# Patient Record
Sex: FEMALE
[De-identification: names, ages, dates, MRNs, and addresses within clinical notes are randomized per-mention and may not be internally consistent; named-entity substitution may affect disease eponyms.]

---

## 2019-08-08 PROBLEM — Z00.00 ENCOUNTER FOR PREVENTIVE HEALTH EXAMINATION: Status: ACTIVE | Noted: 2019-08-08

## 2019-08-26 ENCOUNTER — APPOINTMENT (OUTPATIENT)
Dept: PHYSICAL MEDICINE AND REHAB | Facility: CLINIC | Age: 44
End: 2019-08-26
Payer: COMMERCIAL

## 2019-08-26 VITALS — HEIGHT: 66 IN | RESPIRATION RATE: 16 BRPM | BODY MASS INDEX: 23.78 KG/M2 | WEIGHT: 148 LBS

## 2019-08-26 VITALS — OXYGEN SATURATION: 99 % | HEART RATE: 66 BPM

## 2019-08-26 DIAGNOSIS — Z72.0 TOBACCO USE: ICD-10-CM

## 2019-08-26 PROCEDURE — 99204 OFFICE O/P NEW MOD 45 MIN: CPT

## 2019-09-10 ENCOUNTER — APPOINTMENT (OUTPATIENT)
Dept: PHYSICAL MEDICINE AND REHAB | Facility: CLINIC | Age: 44
End: 2019-09-10
Payer: COMMERCIAL

## 2019-09-24 ENCOUNTER — APPOINTMENT (OUTPATIENT)
Dept: PHYSICAL MEDICINE AND REHAB | Facility: CLINIC | Age: 44
End: 2019-09-24
Payer: COMMERCIAL

## 2019-09-24 DIAGNOSIS — M51.17 INTERVERTEBRAL DISC DISORDERS WITH RADICULOPATHY, LUMBOSACRAL REGION: ICD-10-CM

## 2019-09-24 PROCEDURE — 64483 NJX AA&/STRD TFRM EPI L/S 1: CPT | Mod: 50

## 2023-04-12 ENCOUNTER — APPOINTMENT (OUTPATIENT)
Dept: PHYSICAL MEDICINE AND REHAB | Facility: CLINIC | Age: 48
End: 2023-04-12
Payer: COMMERCIAL

## 2023-04-12 VITALS
SYSTOLIC BLOOD PRESSURE: 133 MMHG | BODY MASS INDEX: 25.88 KG/M2 | HEART RATE: 81 BPM | WEIGHT: 161 LBS | HEIGHT: 66 IN | DIASTOLIC BLOOD PRESSURE: 83 MMHG

## 2023-04-12 PROCEDURE — 99205 OFFICE O/P NEW HI 60 MIN: CPT

## 2023-04-12 RX ORDER — METHOCARBAMOL 750 MG/1
750 TABLET, FILM COATED ORAL
Qty: 60 | Refills: 0 | Status: ACTIVE | COMMUNITY
Start: 2023-04-12 | End: 1900-01-01

## 2023-04-12 RX ORDER — GABAPENTIN 100 MG/1
100 CAPSULE ORAL
Qty: 60 | Refills: 3 | Status: ACTIVE | COMMUNITY
Start: 2023-04-12 | End: 1900-01-01

## 2023-04-12 NOTE — PHYSICAL EXAM
[FreeTextEntry1] : Gen: A+O x 3 in NAD\par Psych: Normal mood and affect. Responds appropriately to commands\par Eyes: Anicteric. No discharge. EOMI.\par Resp: Breathing unlabored\par CV: DP pulses 2+ and equal. No varicosities noted\par Ext: No c/c/e\par Skin: No lesions noted\par \par Gait:\par Non antalgic \par +  reciprocating heel to toe\par able to stand on toes and heels WITH hand holding\par Tandem gait intact WITH hand holding\par Poor single leg standing balance R>L\par Romberg negative\par \par able to perform 10 calf raises on the left, not on the right due to axial low back pain not due to subjective weakness of foot\par Trendelenburg present with right stance leg\par \par Inspection: Spine alignment is midline.\par Palpation: There is + tenderness over the midline spinous processes, paravertebral muscles of the thoracolumbar region\par Lumbar ROM: Flexion, extension, side-bending, rotation, limited in most planes\par + pain with lateral flexion\par + pain with oblique extension\par + pain with lateral rotation \par \par Hip ROM: neg pain at terminal ROM bilaterally.\par FAIR, FABERE negative bilaterally.\par \par 	Hip Flex       Knee Ext      Ankle Dorsi           EHL        Ankle Plantar\par Right	4/5	        5/5	                 5-/5	          4/5	             5/5                           \par Left	4/5	        5/5	                 5/5	          4/5	             5/5                           \par \par Hip abduction R 4/5 L 4/5\par Hip adduction R 4/5 L 4/5\par Hip extension R 4/5 L 4/5\par Knee Flexion R 4/5 L 4/5\par \par Tone: Normal. No clonus.\par Sensation: Grossly intact to light touch bilateral lower limbs.\par Proprioception: Intact at big toes bilaterally.\par Reflexes: 2+ symmetric knee jerk, ankle jerk. \par Plantars downgoing bilaterally.\par SLR negative bilaterally\par Crossed SLR negative bilaterally.\par Slump Test negative bilaterally\par \par prone gapping test + L>R\par yeoman + B/L\par nachlas +right\par active hip extension was more difficult on the right\par + stork on the right > left\par + neil finger towards the right\par + ttp over the right SIJ >> left

## 2023-04-12 NOTE — ASSESSMENT
[FreeTextEntry1] :                                                       Assessment/Plan:\par \par GONZALES CARL is a 47 year female with axial > radicular low back pain here for initial consultation.\par \par Radiculitis, lumbo, S1, Right\par Sacroiliac joint dysfunction, B/L\par Lumbosacral spondylosis with radiculopathy, L5, S1, R>L\par Spasm of the lumbar paraspinous muscles\par Sacroiliac joint dysfunction, B/L\par Hip weakness, B/L\par \par Tobacco use, 0.5 PPD\par \par - Tiers of treatment and management of above diagnosis(es) were discussed with patient\par - Optimal diet, weight, sleep, and lifestyle management to minimize stress and maximize well being counseling provided\par - Imaging reviewed and discussed with patient\par - Reviewed previous encounter notes from 9/1/2019 Dr. MERRILL Barrett (Pain)\par - Patient was advised to start a structured, targeted therapy program 2-3x/wk for 8 wks with goal toward HEP\par - Patient was educated on an appropriate home exercise program, provided with exercise recommendations, all questions answered\par - Patient was advised to start gabapentin for their neuropathic pain symptoms. Patient was instructed to begin with 100mg at bedtime for the next week. If well tolerated, patient will double their nightly dose to 200mg at bedtime. After another week, if the medication is well tolerated, they will commence 300mg at bedtime. Patient provided with written instructions as well. All questions were answered and the patient displayed a clear understanding of the plan of care, including titration of gabapentin. The patient was informed about not taking this medication at the same time as any sleeping or muscle relaxant pills. Pt was also notified to stop, and contact our office, if it is too sedating, ankle swelling occurs and/or they experience suicidal thinking.\par - Patient may trial acetaminophen 1000mg up to TID PRN moderate to severe pain and to decrease average consumption of NSAIDs\par - Patient was advised to apply cool compresses or warm heat to affected regions PRN\par \par - Patient was prescribed medrol dose pack (x2) with written instructions, all questions answered, informed of side effects of the medication.  Possible side effects, including hyperglycemia, GI upset, and GI bleed, reviewed with patient. In agreement with patient that potential pain reduction and anti-inflammatory benefits currently outweigh known side effect profile for oral corticosteroids. Patient instructed to immediately stop medication should she develop any abdominal pain, nausea, vomiting, bloody stools, or BRBPR\par \par - Educated about red flag symptoms including (but not limited to) new, worsened, or persistent: fever greater than 100F, bowel or bladder incontinence, bowel obstipation, inability to void urine, urinary leakage, Severe nausea or vomiting, Worsening numbness, worsening tingling/paresthesias, and/or new or progressive motor weakness; advised to seek immediate medical attention at his nearest Emergency department should they experience any of the above\par \par - MRI lumbar spine without contrast is indicated given that the pt has not improved with tylenol, ibuprofen, naproxen, meloxicam, and physical therapy/home exercise program>6 weeks since 2019 course of PT and injections. Patient's imaging is medically necessary to outline targets for locally (interventional) directed treatments and/or guide surgical management.\par \par - Follow up in 2-3 weeks after imaging\par \par I have personally spent a total of at least 65 minutes preparing, reviewing internal and external records, explaining, counseling, and coordinating care for this patient encounter.\par \par Thank you, (s), for allowing me to participate in the care of your patient. Please do not hesitate to contact me with questions/concerns.\par \par Ion Britton M.D.\par Sports and Interventional Spine\[facility] Department of Physical Medicine and Rehabilitation \par [facility] \par Email: [email or web address]\par \par [facility]\par Orth[facility] [place] [facility] [address]\par [facility], 11th Floor\[place] [address]\[facility] \par Appointments: [phone]\par Fax: [phone]\[facility]

## 2023-04-12 NOTE — HISTORY OF PRESENT ILLNESS
[FreeTextEntry1] : Ion Britton M.D.\par Sports Medicine and Interventional Spine\[facility] Department of Physical Medicine and Rehabilitation \[facility] \[facility] Email: [email or web address] <[email or web address]>\par \par [facility]\par [facility]\par [address]\par [place], [address]th Floor\[place] [address]\par \par [facility]\par [address], 4th Floor\[place] [address]\par \par [facility]  \par [facility]\par [address], 6[address]\par [place], [place] [address] \[facility] [place] Appointments\par Phone: [phone]\par Fax: [phone]\par \par ----------------------------------------------------------------------------------------------------------------------------------------\par \par PATIENT: GONZALES CARL \par MRN: [id number] \par YOB: 1975 \par DATE OF SERVICE: 04/12/2023 \par \par Apr 12, 2023 \par \par \par Dear Drs.\par \par Thank you for referring GONZALES CARL to my Sports and Interventional Spine practice and office. Enclosed is a copy of the patient's consultation/progress note, which includes my complete assessment and recent studies completed during the patient's evaluation.\par \par If you have questions or have any patients who require nonsurgical, non-opiate management of any sports, spine, or musculoskeletal conditions, please do not hesitate to contact my , Josseline Yoder at [phone].\par \par I look forward to taking care of your patients along with you.\par \par Sincerely,\par \par Ion\par \par Ion Britton MD\par Sports, Interventional Spine, & Regenerative Musculoskeletal Medicine\par [facility]\par Email: [email or web address]\par \par \par                                                   Initial Consultation:\par CC: back pain\par \par HPI:  This is the [facility] to [facility] and Interventional Spine Practice.  \par \par GONZALES CARL presents with the chief complaint as above.  \par \par Initial Hx on 04/12/2023 :\par Presents in person to [facility]\par patient with chronic low back pain, patient had multiple LESI with Dr. Barrett\par last LESI in 2019, relief lasted for about two years\par due to their emotional stress, increased workload, and increased walking they seem to have worsened their chronic low back pain; patient has not been working for the past week\par patient having right low back pain, right SIJ, right PSIS; over the past week pain is now over the both sides with radicular component into the right leg\par \par The patient’s difficulties began worsening since April 1, 2023\par The pain is graded as 8/10\par The pain is described as aching, shooting, stabbing\par The pain is constant\par The pain does not radiate primarily but has sparse pain in the RIGHT LOWER limbs in a S1 distribution\par The patient feels that the pain is overall persistent \par Patient denies other recent fall, MVA, injury, trauma, or accident besides presenting history above\par \par Aggravating: lateral rotation, side lying sleeping position; sit to stand transitional movements, ambulation, prolonged sitting, prolonged standing\par Alleviating: rest, activity modification, tens machine, avoiding abrupt change in position, pharmacologic treatments (aleve 220mg BID, for the past six days)\par \par Meds: denies regular PO pain medications\par Therapy Program: no recent structured targeted therapy program\par HEP: doing HEP regularly\par \par Assoc Sx:\par Reports intermittent numbness, tingling paresthesia in the Left LOWER limbs in a plantar distribution\par Otherwise denies numbness, Tingling\par Denies Focal motor weakness in the upper or lower limbs\par Denies New or worsened bowel or bladder incontinence\par Denies Saddle anesthesia\par Denies Using Orthotic(s)/Supportive devices\par Denies Swelling in the upper/lower extremities\par They also deny frequent tripping, falling\par \par ROS: A 14 point review of systems was completed. Positive findings are pain as described above. The remaining systems negative.\par \par Breast Cancer Surveillance: up to date\par COVID HX: reviewed\par \par Assoc Hx:\par Ambulates without assistive device\par Injection Hx: denies locally directed treatment to the area in question\par Imaging Hx: reviewed\par \par Level of functioning: indep with ambulation, indep with ADLs\par Living Situation: dwelling with steps to enter

## 2023-04-20 ENCOUNTER — NON-APPOINTMENT (OUTPATIENT)
Age: 48
End: 2023-04-20

## 2023-05-10 ENCOUNTER — APPOINTMENT (OUTPATIENT)
Dept: PHYSICAL MEDICINE AND REHAB | Facility: CLINIC | Age: 48
End: 2023-05-10
Payer: COMMERCIAL

## 2023-05-10 VITALS
DIASTOLIC BLOOD PRESSURE: 77 MMHG | SYSTOLIC BLOOD PRESSURE: 131 MMHG | BODY MASS INDEX: 25.88 KG/M2 | HEIGHT: 66 IN | HEART RATE: 88 BPM | OXYGEN SATURATION: 97 % | WEIGHT: 161 LBS

## 2023-05-10 DIAGNOSIS — M54.17 RADICULOPATHY, LUMBOSACRAL REGION: ICD-10-CM

## 2023-05-10 DIAGNOSIS — M53.3 SACROCOCCYGEAL DISORDERS, NOT ELSEWHERE CLASSIFIED: ICD-10-CM

## 2023-05-10 DIAGNOSIS — M47.27 OTHER SPONDYLOSIS WITH RADICULOPATHY, LUMBOSACRAL REGION: ICD-10-CM

## 2023-05-10 DIAGNOSIS — M62.830 MUSCLE SPASM OF BACK: ICD-10-CM

## 2023-05-10 DIAGNOSIS — R29.898 OTHER SYMPTOMS AND SIGNS INVOLVING THE MUSCULOSKELETAL SYSTEM: ICD-10-CM

## 2023-05-10 DIAGNOSIS — M47.817 SPONDYLOSIS W/OUT MYELOPATHY OR RADICULOPATHY, LUMBOSACRAL REGION: ICD-10-CM

## 2023-05-10 PROCEDURE — 99215 OFFICE O/P EST HI 40 MIN: CPT

## 2023-05-10 RX ORDER — METHYLPREDNISOLONE 4 MG/1
4 TABLET ORAL
Qty: 2 | Refills: 0 | Status: COMPLETED | COMMUNITY
Start: 2023-04-12 | End: 2023-05-10

## 2023-05-17 NOTE — DATA REVIEWED
[MRI] : MRI [FreeTextEntry1] : RN: [id number] Acc: [id number]\par Date of Exam: 04-\par  \par EXAM:  MRI LUMBAR SPINE WITHOUT CONTRAST\par \par HISTORY: Chronic low back pain and lumbar radiculopathy.\par \par TECHNIQUE: Multiplanar, multi-sequential MRI of the lumbar spine was obtained on a 1.5T scanner using a standard protocol.\par \par COMPARISON:  MRI lumbar spine 9/11/2019.\par \par FINDINGS:\par \par For purposes of this dictation, the last well-formed disc space will be labeled L5-S1.\par \par OSSEOUS STRUCTURES: Vertebral body heights are preserved. No marrow edema or destructive marrow infiltrative process. Stable moderate hemangioma of the T12 vertebral body. Redemonstration of developmentally small lumbar spinal canal measuring 14 mm in the anteroposterior dimension at L3-L5 levels.\par \par ALIGNMENT: Stable mild thoracolumbar levoscoliosis with apex at L2-L3 and grade 1 retrolisthesis of 3 mm of L5 and 2 mm of L4.\par \par SPINAL CORD AND CONUS MEDULLARIS: Normal signal.\par \par PARASPINAL AND INTRA-ABDOMINAL SOFT TISSUES: Unremarkable.\par \par INCLUDED THORACIC SPINE AND SACRUM: Unremarkable.\par \par DISCS: Stable L4-S1 moderate desiccation and progression of mild narrowing of the disc space and Modic type II endplate changes at L5-S1.\par \par FACETS: Mild progression of L4-S1 moderate facet arthrosis, and stable mild at the other lumbar levels.\par \par The following axial levels are imaged and detailed below:\par \par L1-L2: No disc herniation or spinal canal stenosis.  No foraminal stenosis.\par \par L2-L3: No disc herniation or spinal canal stenosis.  No foraminal stenosis.\par \par L3-L4: No disc herniation or spinal canal stenosis.  No foraminal stenosis.\par \par L4-L5: Stable small central disc protrusion with annular fissuring, small left asymmetric disc bulge, facet arthrosis, ligamenta flava hypertrophy resulting in mild spinal canal, subarticular recesses, left foraminal stenoses and impingement of bilateral transversing L5 nerve roots.\par \par L5-S1: Mild increased of moderate right paracentral disc protrusion with minimal annular fissuring, small symmetric disc bulge, posterior osteophytes, facet arthrosis, ligamenta flava hypertrophy resulting in moderate foraminal, mild subarticular recess stenoses and impingement of bilateral exiting L5 nerve roots. No spinal canal stenosis.\par \par IMPRESSION:\par Redemonstration of developmentally small lumbar spinal canal and mild progression of spondylosis at L4-S1 including discogenic disease, moderate facet arthrosis and grade 1 retrolisthesis.\par \par L4-L5: Stable small central disc protrusion with annular fissuring, small left asymmetric disc bulge, facet arthrosis, ligamenta flava hypertrophy resulting in mild spinal canal, subarticular recesses, left foraminal stenoses and impingement of bilateral transversing L5 nerve roots.\par \par L5-S1: Mild increase of moderate right paracentral disc protrusion with minimal annular fissuring, small symmetric disc bulge osteophyte complex, facet arthrosis, ligamenta flava hypertrophy resulting in moderate foraminal, mild subarticular recess stenoses and impingement of bilateral exiting L5 nerve roots. No spinal canal stenosis.\par \par

## 2023-05-17 NOTE — ASSESSMENT
[FreeTextEntry1] :                                                       Assessment/Plan:\par \par GONZALES CARL is a 48 year female with axial > radicular low back pain here for follow up \par \par Radiculitis, lumbosacral, S1, Right\par Sacroiliac joint dysfunction, B/L\par Lumbosacral spondylosis with radiculopathy, L5, S1, R>L\par Spasm of the lumbar paraspinous muscles\par Sacroiliac joint dysfunction, B/L\par Hip weakness, B/L\par \par Tobacco use, 0.5 PPD\par \par - Tiers of treatment and management of above diagnosis(es) were discussed with patient\par - Optimal diet, weight, sleep, and lifestyle management to minimize stress and maximize well being counseling provided\par - Imaging reviewed and discussed with patient\par - Reviewed previous encounter notes from 9/1/2019 Dr. MERRILL Barrett (Pain)\par - Patient was again advised to start a structured, targeted therapy program 2-3x/wk for 8 wks with goal toward HEP\par - Patient was educated on an appropriate home exercise program, provided with exercise recommendations, all questions answered\par - Patient was advised to resume gabapentin for their neuropathic pain symptoms. Resume at 200mg at bedtime. After another week, if the medication is well tolerated, they will commence 300mg at bedtime. Patient provided with written instructions as well. All questions were answered and the patient displayed a clear understanding of the plan of care, including titration of gabapentin. The patient was informed about not taking this medication at the same time as any sleeping or muscle relaxant pills. Pt was also notified to stop, and contact our office, if it is too sedating, ankle swelling occurs and/or they experience suicidal thinking.\par - Patient may trial acetaminophen 1000mg up to TID PRN moderate to severe pain and to decrease average consumption of NSAIDs\par - Patient was prescribed methocarbamol 750mg 1-2 tabs up to TID PRN muscle spasm, informed of sedative potential, advised to avoid driving, taking the subway, or operating heavy machinery, patient displayed a clear understanding of the plan including medication, its purpose and side effects, all questions answered\par - Patient was advised to apply cool compresses or warm heat to affected regions PRN\par - Noted that patient has attempted several (pharmacologic) treatment options for pain, patient interested in discussing pharm/non-pharm options with Pain, Rx provided\[facility] \[facility] - Educated about red flag symptoms including (but not limited to) new, worsened, or persistent: fever greater than 100F, bowel or bladder incontinence, bowel obstipation, inability to void urine, urinary leakage, Severe nausea or vomiting, Worsening numbness, worsening tingling/paresthesias, and/or new or progressive motor weakness; advised to seek immediate medical attention at his nearest Emergency department should they experience any of the above\par \[facility] - Follow up in 2-3 weeks after imaging\par \par I have personally spent a total of at least 40 minutes preparing, reviewing internal and external records, explaining, counseling, and coordinating care for this patient encounter.\[facility] \[facility] Thank you, (s), for allowing me to participate in the care of your patient. Please do not hesitate to contact me with questions/concerns.\[facility] \[facility] Ion Britton M.D.\[facility] Sports and Interventional Spine\[facility] Department of Physical Medicine and Rehabilitation \[facility] \[facility] Email: [email or web address] \[facility] [place] [facility] [address]\[facility] [place], [address] \[facility] Appointments: [phone]\[facility] Fax: [phone]\[facility]

## 2023-05-17 NOTE — HISTORY OF PRESENT ILLNESS
[FreeTextEntry1] : Ion Britton M.D.\par Sports Medicine and Interventional Spine\[facility] Department of Physical Medicine and Rehabilitation \[facility] \[facility] Email: [email or web address] <[email or web address]>\par \par [facility]\par [facility]\par [address]\par [place], [address]th Floor\[place] [address]\par \par [facility]\par [address], 4[address] Floor\[place] [address]\par \par [facility]  \par [facility]\par [address], 6[address]\[place] [address]\[facility] \[facility] [place] [facility]\par Phone: [phone]\par Fax: [phone]\par \par ----------------------------------------------------------------------------------------------------------------------------------------\par \par PATIENT: GONZALES CARL \par MRN: [id number] \par YOB: 1975 \par DATE OF SERVICE: 05/10/2023\par \par May 10, 2023 \[facility] \par Dear Drs.\par \par Thank you for referring GONZALES CARL to my Sports and Interventional Spine practice and office. Enclosed is a copy of the patient's consultation/progress note, which includes my complete assessment and recent studies completed during the patient's evaluation.\par \par If you have questions or have any patients who require nonsurgical, non-opiate management of any sports, spine, or musculoskeletal conditions, please do not hesitate to contact my , Josseline Yoder at [phone].\par \par I look forward to taking care of your patients along with you.\par \par Sincerely,\par \par Ion\par \par Ion Britton MD\par Sports, Interventional Spine, & Regenerative Musculoskeletal Medicine\par [facility] [place] [facility]\par Email: [email or web address]\par \par \par                                                   Follow up Visit\par CC: back pain\par \par HPI:  This is a follow up visit to [facility] [place] [facility] Spine [facility].  \par \par GONZALES CARL presents with the chief complaint as above.  \par \par Interval Hx on May 10, 2023: presents for follow up. Since last visit, they underwent further diagnostic workup including additional imaging studies. Patient informed of all relevant imaging findings, all questions were answered, including L4/5, L5/S1 disc degeneration. There have been no significant changes to their aggravating or alleviating factors since the last visit. Since the last visit, they relate improvements in pain previously associated with known exacerbating, aggravating factors and situations. Pharmacologic treatments now include OTC analgesics PRN, otherwise pharmacologic treatments are minimal. Denies new or worsened numbness, tingling, or focal motor deficit. Denies interval fall, accident, or injury. Denies change in bowel or bladder functioning.\par \par Meds: denies regular PO pain medications\par Therapy Program: no recent structured targeted therapy program\par HEP: doing HEP regularly\par \par Assoc Sx:\par Reports intermittent numbness, tingling paresthesia in the Left LOWER limbs in a plantar distribution\par Otherwise denies numbness, Tingling\par Denies Focal motor weakness in the upper or lower limbs\par Denies New or worsened bowel or bladder incontinence\par Denies Saddle anesthesia\par Denies Using Orthotic(s)/Supportive devices\par Denies Swelling in the upper/lower extremities\par They also deny frequent tripping, falling\par \par ROS: A 14 point review of systems was completed. Positive findings are pain as described above. The remaining systems negative.\par \par Breast Cancer Surveillance: up to date\par COVID HX: reviewed\par \par Initial Hx on 04/12/2023: Presents in person to [facility]. patient with chronic low back pain, patient had multiple LESI with Dr. Barrett. last LESI in 2019, relief lasted for about two years. due to their emotional stress, increased workload, and increased walking they seem to have worsened their chronic low back pain; patient has not been working for the past week. patient having right low back pain, right SIJ, right PSIS; over the past week pain is now over the both sides with radicular component into the right leg. The patient’s difficulties began worsening since April 1, 2023. The pain is graded as 8/10. The pain is described as aching, shooting, stabbing. The pain is constant\par The pain does not radiate primarily but has sparse pain in the RIGHT LOWER limbs in a S1 distribution. The patient feels that the pain is overall persistent. Patient denies other recent fall, MVA, injury, trauma, or accident besides presenting history above. Aggravating: lateral rotation, side lying sleeping position; sit to stand transitional movements, ambulation, prolonged sitting, prolonged standing. Alleviating: rest, activity modification, tens machine, avoiding abrupt change in position, pharmacologic treatments (aleve 220mg BID, for the past six days)\par \par Assoc Hx:\par Ambulates without assistive device\par Injection Hx: denies locally directed treatment to the area in question\par Imaging Hx: reviewed\par \par Level of functioning: indep with ambulation, indep with ADLs\par Living Situation: dwelling with steps to enter

## 2023-05-17 NOTE — PHYSICAL EXAM
[FreeTextEntry1] : Gen: A+O x 3 in NAD\par Psych: Normal mood and affect. Responds appropriately to commands\par Eyes: Anicteric. No discharge. EOMI.\par Resp: Breathing unlabored\par CV: DP pulses 2+ and equal. No varicosities noted\par Ext: No c/c/e\par Skin: No lesions noted\par \par Gait:\par Non antalgic \par +  reciprocating heel to toe\par able to stand on toes and heels WITH hand holding\par Tandem gait intact WITH hand holding\par Poor single leg standing balance R>L\par Romberg negative\par \par able to perform 10 calf raises on the left and now also on the right\par Trendelenburg present with right stance leg\par \par Inspection: Spine alignment is midline.\par Palpation: There is + tenderness over the midline spinous processes, paravertebral muscles of the thoracolumbar region\par Lumbar ROM: Flexion, extension, side-bending, rotation, limited in most planes\par + pain with lateral flexion\par + pain with oblique extension\par + pain with lateral rotation \par \par Hip ROM: neg pain at terminal ROM bilaterally.\par FAIR, FABERE negative bilaterally.\par \par 	Hip Flex       Knee Ext      Ankle Dorsi           EHL        Ankle Plantar\par Right	4/5	        5/5	                 5-/5	          4/5	             5/5                           \par Left	4/5	        5/5	                 5/5	          4/5	             5/5                           \par \par Hip abduction R 4/5 L 4/5\par Hip adduction R 4/5 L 4/5\par Hip extension R 4/5 L 4/5\par Knee Flexion R 4/5 L 4/5\par \par Tone: Normal. No clonus.\par Sensation: Grossly intact to light touch bilateral lower limbs.\par Proprioception: Intact at big toes bilaterally.\par Reflexes: 2+ symmetric knee jerk, ankle jerk. \par Plantars downgoing bilaterally.\par SLR negative bilaterally\par Crossed SLR negative bilaterally.\par Slump Test negative bilaterally\par \par prone gapping test + L>R\par yeoman + B/L\par nachlas +right\par active hip extension was more difficult on the right\par + stork on the right > left\par + neil finger towards the right\par + ttp over the right SIJ >> left

## 2023-06-21 ENCOUNTER — APPOINTMENT (OUTPATIENT)
Dept: PHYSICAL MEDICINE AND REHAB | Facility: CLINIC | Age: 48
End: 2023-06-21